# Patient Record
Sex: FEMALE | Race: BLACK OR AFRICAN AMERICAN | NOT HISPANIC OR LATINO | URBAN - METROPOLITAN AREA
[De-identification: names, ages, dates, MRNs, and addresses within clinical notes are randomized per-mention and may not be internally consistent; named-entity substitution may affect disease eponyms.]

---

## 2023-10-24 ENCOUNTER — OUTPATIENT (OUTPATIENT)
Dept: INPATIENT UNIT | Facility: HOSPITAL | Age: 23
LOS: 1 days | End: 2023-10-24
Payer: MEDICAID

## 2023-10-24 VITALS — HEART RATE: 84 BPM | SYSTOLIC BLOOD PRESSURE: 124 MMHG | DIASTOLIC BLOOD PRESSURE: 72 MMHG

## 2023-10-24 VITALS — HEART RATE: 80 BPM | TEMPERATURE: 98 F | SYSTOLIC BLOOD PRESSURE: 121 MMHG | DIASTOLIC BLOOD PRESSURE: 77 MMHG

## 2023-10-24 DIAGNOSIS — O26.899 OTHER SPECIFIED PREGNANCY RELATED CONDITIONS, UNSPECIFIED TRIMESTER: ICD-10-CM

## 2023-10-24 PROCEDURE — 99283 EMERGENCY DEPT VISIT LOW MDM: CPT

## 2023-10-24 PROCEDURE — G0463: CPT

## 2023-10-24 PROCEDURE — 59025 FETAL NON-STRESS TEST: CPT

## 2023-10-24 NOTE — OB RN TRIAGE NOTE - FALL HARM RISK - UNIVERSAL INTERVENTIONS
Bed in lowest position, wheels locked, appropriate side rails in place/Call bell, personal items and telephone in reach/Instruct patient to call for assistance before getting out of bed or chair/Non-slip footwear when patient is out of bed/[place] to call system/Physically safe environment - no spills, clutter or unnecessary equipment/Purposeful Proactive Rounding/Room/bathroom lighting operational, light cord in reach

## 2023-10-24 NOTE — OB PROVIDER TRIAGE NOTE - ATTENDING COMMENTS
23y  at 34w2d GA who presents to L&D for brown discharge that started 2 days ago. Denies BRB, itching, urinary complaints. She is also complaining of upper back pain. The pt came in by ambulance because she was at work and travels there by public transportation. She sees a OB in [place].  Patient was late to PNC, has not been following up with her OB. Supposed to be having weekly BPP/NST according to her sono report from     Pt denies contractions and leakage of fluid. She endorses good fetal movement.   She denies fevers, chills, nausea, vomiting.     Pregnancy course is significant for:  velamentous cord insertion without vasa previa  late to PNC    Fetal heart tracing: reactive, reassuring  [place]: irregular, irritable  Velamentous cord insertion: Discussed with patient that she needs to follow up with her OB and go to her weekly ultrasounds for surveillance of fetal well being. Discussed what a velamentous cord insertion is at length with the patient  -Brown discharge: No active bleeding on exam    Patient in stable condition and may be discharged home  follow up with primary OB  fkc and labor warnings given to patient    Dr Neves

## 2023-10-24 NOTE — OB PROVIDER TRIAGE NOTE - NSOBPROVIDERNOTE_OBGYN_ALL_OB_FT
A/P: 24 yo  with velamentous cord insertion at 34w2d, here with brown discharge but no BRB.    Fetus: reactive, reassuring  [place]: irregular, irritable  Velamentous cord insertion: Discussed with patient that she needs to follow up with her OB and go to her weekly ultrasounds for surveillance of fetal well being. Discussed what a velamentous cord insertion is at length with the patient  -Brown discharge: No active bleeding on exam  -Patient expresses anxiety, has medications at home prescribed by her OB for anxiety  -Discussed use of Tylenol for her back pain. Sent to her pharmacy as she has none at home.   Dispo: Stable for d/c home with followup. Discussed return precautions    Discussed with Dr. Neves

## 2023-10-24 NOTE — OB PROVIDER TRIAGE NOTE - HISTORY OF PRESENT ILLNESS
ERICK GALVEZ is a 23y  at 34w2d GA who presents to L&D for brown discharge that started 2 days ago. Denies BRB, itching, urinary complaints. She is also complaining of upper back pain. The pt came in by ambulance because she was at work and travels there by public transportation. She sees a OB in [place].  Patient was late to PNC, has not been following up with her OB. Supposed to be having weekly BPP/NST according to her sono report from     Pt denies contractions and leakage of fluid. She endorses good fetal movement.   She denies fevers, chills, nausea, vomiting.     Pregnancy course is significant for:  velamentous cord insertion without vasa previa  late to PNC    POB:   PGYN: -fibroids/-cysts, denied STD hx, denies abnormal PAPs  PMH: denies  PSH: denies  SH: Denies tobacco use, EtOH use and illicit drug use during the pregnancy; Feels safe at home  Meds: pnv, iron  All: NKDA    T(C): 36.7 (10-24-23 @ 19:56), Max: 36.7 (10-24-23 @ 19:48)  HR: 80 (10-24-23 @ 20:46) (80 - 84)  BP: 121/77 (10-24-23 @ 20:46) (121/77 - 124/72)  RR: 20 (10-24-23 @ 19:56) (20 - 20)    Gen: NAD, well-appearing  Abd: soft, gravid  Ext: non-edematous, non-tender   SVE: deferred  SSE: cervix visualized, closed and without any signs of bleeding or drainage, no pooling   FHT: baseline 145, moderate variability, +accels, -decels   [place]: irritability

## 2023-10-26 DIAGNOSIS — O99.343 OTHER MENTAL DISORDERS COMPLICATING PREGNANCY, THIRD TRIMESTER: ICD-10-CM

## 2023-10-26 DIAGNOSIS — Z3A.34 34 WEEKS GESTATION OF PREGNANCY: ICD-10-CM

## 2023-10-26 DIAGNOSIS — O43.123 VELAMENTOUS INSERTION OF UMBILICAL CORD, THIRD TRIMESTER: ICD-10-CM

## 2023-10-26 DIAGNOSIS — F41.9 ANXIETY DISORDER, UNSPECIFIED: ICD-10-CM

## 2023-10-26 DIAGNOSIS — N89.8 OTHER SPECIFIED NONINFLAMMATORY DISORDERS OF VAGINA: ICD-10-CM

## 2023-10-26 DIAGNOSIS — O99.891 OTHER SPECIFIED DISEASES AND CONDITIONS COMPLICATING PREGNANCY: ICD-10-CM

## 2023-10-26 DIAGNOSIS — M54.6 PAIN IN THORACIC SPINE: ICD-10-CM

## 2024-04-12 ENCOUNTER — EMERGENCY (EMERGENCY)
Facility: HOSPITAL | Age: 24
LOS: 0 days | Discharge: HOME | End: 2024-04-12
Payer: COMMERCIAL

## 2024-04-12 VITALS
RESPIRATION RATE: 14 BRPM | DIASTOLIC BLOOD PRESSURE: 70 MMHG | SYSTOLIC BLOOD PRESSURE: 110 MMHG | HEIGHT: 64 IN | TEMPERATURE: 99 F | HEART RATE: 86 BPM | OXYGEN SATURATION: 97 % | WEIGHT: 182.1 LBS

## 2024-04-12 VITALS
DIASTOLIC BLOOD PRESSURE: 71 MMHG | OXYGEN SATURATION: 100 % | TEMPERATURE: 98 F | SYSTOLIC BLOOD PRESSURE: 108 MMHG | RESPIRATION RATE: 18 BRPM | HEART RATE: 78 BPM

## 2024-04-12 DIAGNOSIS — Z91.010 ALLERGY TO PEANUTS: ICD-10-CM

## 2024-04-12 DIAGNOSIS — Z32.00 ENCOUNTER FOR PREGNANCY TEST, RESULT UNKNOWN: ICD-10-CM

## 2024-04-12 DIAGNOSIS — Z32.01 ENCOUNTER FOR PREGNANCY TEST, RESULT POSITIVE: ICD-10-CM

## 2024-04-12 PROBLEM — Z78.9 OTHER SPECIFIED HEALTH STATUS: Chronic | Status: ACTIVE | Noted: 2023-10-24

## 2024-04-12 LAB — HCG UR QL: NEGATIVE — SIGNIFICANT CHANGE UP

## 2024-04-12 PROCEDURE — 99283 EMERGENCY DEPT VISIT LOW MDM: CPT

## 2024-04-12 NOTE — ED PROVIDER NOTE - PATIENT PORTAL LINK FT
You can access the FollowMyHealth Patient Portal offered by [facility] by registering at the following website: [email or web address]. By joining [facility]’s FollowMyHealth portal, you will also be able to view your health information using other applications (apps) compatible with our system.

## 2024-04-12 NOTE — ED PROVIDER NOTE - CLINICAL SUMMARY MEDICAL DECISION MAKING FREE TEXT BOX
23F w/ no reported PMH who presents to ED for pregnancy test. Pt w/ recent pregnancy, currently w/ 5 month old baby, recently received Depo-Provera 2 weeks ago, pt reports unprotected sexual intercourse during that time period. Pt concerned because her period is approximately 10 days late, pt reports previous pregnancy despite receiving Depo-Provera, pt did not take at home pregnancy test. Pt w/ frequent OBGYN follow-up. Pt otherwise asymptomatic - denies fever, chills, chest pain, shortness of breath, abdominal pain, N/V/D, dysuria, urinary frequency/urgency, extremity weakness/numbness/tingling, lightheadedness, dizziness, or headaches.    Patient currently afebrile, hemodynamically stable, spO2 97%. On PE - pt well-appearing, in no acute distress, heart w/ RRR, chest symmetrical, non-labored breathing, lungs clear bilaterally, abdomen soft/non-distended/non-tender to palpation. Will obtain urine pregnancy test at this time.    Shared Decision Making - Delayed menstrual cycle likely in setting of recent Depo-Provera injection. Urine pregnancy test negative, discussed w/ pt need to repeat urine pregnancy testing at home frequently, and follow-up with OBGYN. Patient is medically stable for discharge. Strict return precautions given, discussed red flag signs/symptoms. Patient to follow up with PMD, OBGYN. Patient/parent displays understanding and agreeable with plan, comfortable with discharge plan home.

## 2024-04-12 NOTE — ED PROVIDER NOTE - NSFOLLOWUPINSTRUCTIONS_ED_ALL_ED_FT
Follow-up with your Primary Care Physician within the next week.  Follow-up with your OBGYN within the next week as discussed.    Advance activity as tolerated.  Continue all previously prescribed medications as directed unless otherwise instructed.  Follow up with your primary care physician in 48-72 hours- bring copies of your results.  Return to the ER for worsening or persistent symptoms, and/or ANY NEW OR CONCERNING SYMPTOMS such as fever, chest pain, shortness of breath, abdominal pain, intractable vomiting/diarrhea, vaginal bleeding, neck pain/stiffness, fainting/passing out, or headaches. If you have issues obtaining follow up, please call: [phone] to obtain a doctor or specialist who takes your insurance in your area.  You may call [phone] to make an appointment with the internal medicine clinic.

## 2024-04-12 NOTE — ED ADULT NURSE NOTE - NSFALLUNIVINTERV_ED_ALL_ED
Bed/Stretcher in lowest position, wheels locked, appropriate side rails in place/Call bell, personal items and telephone in reach/Instruct patient to call for assistance before getting out of bed/chair/stretcher/Non-slip footwear applied when patient is off stretcher/[place] to call system/Physically safe environment - no spills, clutter or unnecessary equipment/Purposeful proactive rounding/Room/bathroom lighting operational, light cord in reach

## 2024-04-12 NOTE — ED ADULT NURSE NOTE - OBJECTIVE STATEMENT
23F presents to the ED requesting for pregnancy testing. Patient received depo provera injection on 3/18 and had unprotected sex on 3/17 and 3/28. Patient states that she received the depo injection before and became pregnant, so she wants to make sure she is not pregnant now

## 2024-04-12 NOTE — ED PROVIDER NOTE - CARE PLAN
Incoming Refill Request      Medication requested (name and dose):HYDROCODONE - CHLORPH ER 10-8MG    [facility] where [facility] be sent: [facility]    Additional details provided by patient: PT IS OUT OF THIS MEDICATION    Best call back number: [phone]    Does the patient have less than a 3 day supply:  [x] Yes  [] No    Christina Childers Rep  11/07/22, 10:51 EST           1 Principal Discharge DX:	Encounter for pregnancy test

## 2024-04-12 NOTE — ED ADULT TRIAGE NOTE - CHIEF COMPLAINT QUOTE
delayed onset of menstruation of 10 days, wants to know of pregnancy status, did not take home pregnancy test

## 2025-05-20 NOTE — OB PROVIDER TRIAGE NOTE - NS_FETALHEARTRATE_OBGYN_ALL_OB_FT
Medication Name: Opal          No prior authorization is needed, Medication Prior Authorization team will close this encounter    
Message sent to patient with info below via live well   
Mounjaro Pending    Insurance response  Prescription Drug Insurance: [employer]hu  Notes: Prior authorization submitted - will update provider when decision has been made by insurance.       
[place] PA at this time  
150